# Patient Record
Sex: FEMALE | Race: WHITE | NOT HISPANIC OR LATINO | Employment: FULL TIME | URBAN - METROPOLITAN AREA
[De-identification: names, ages, dates, MRNs, and addresses within clinical notes are randomized per-mention and may not be internally consistent; named-entity substitution may affect disease eponyms.]

---

## 2022-03-17 NOTE — PROGRESS NOTES
"Chief Complaint   Patient presents with   • Rectal Bleeding           History of Present Illness  Patient is a 36-year-old female who presents today for evaluation.     Patient presents today with concerns about rectal bleeding.  This started January 2021 and has worsened.  She reports the bleeding it comes and goes but has been present with most bowel movements.  She at times has noted mucus in her stools.  She has been experiencing some loose stools and diarrhea as well having around 3-4 bowel movements per day.    She was evaluated for the symptoms recently in the emergency department where CBC was normal.  Their exam in the ER did not identify a source of the bleeding.    She reports that she also has intermittent issues with heartburn.  Symptoms have occurred intermittently over the last several years.      Her sister was recently diagnosed with stage IV stomach cancer.  Her mother has diverticulitis and possibly ulcerative colitis.    She denies any antibiotic use or travel prior to symptom onset.  She takes NSAIDs around twice per week.     Result Review :       CBC AND DIFFERENTIAL (03/13/2022 19:55)       Vital Signs:   /78   Pulse 92   Temp 97.3 °F (36.3 °C)   Ht 165.1 cm (65\")   Wt 109 kg (240 lb)   SpO2 99%   BMI 39.94 kg/m²     Body mass index is 39.94 kg/m².     Physical Exam  Vitals reviewed.   Constitutional:       General: She is not in acute distress.     Appearance: She is well-developed.   HENT:      Head: Normocephalic and atraumatic.   Pulmonary:      Effort: Pulmonary effort is normal. No respiratory distress.   Abdominal:      General: Abdomen is flat. Bowel sounds are normal. There is no distension.      Palpations: Abdomen is soft.      Tenderness: There is no abdominal tenderness.   Skin:     General: Skin is dry.      Coloration: Skin is not pale.   Neurological:      Mental Status: She is alert and oriented to person, place, and time.   Psychiatric:         Thought Content: " Thought content normal.           Assessment and Plan    Diagnoses and all orders for this visit:    1. Rectal bleeding (Primary)    2. Gastroesophageal reflux disease, unspecified whether esophagitis present    3. Diarrhea, unspecified type    4. Family history of gastric cancer         Discussion  Patient presents today for evaluation of rectal bleeding bleeding.  This is a new undiagnosed problem.  Also with diarrhea and heartburn.  We will schedule EGD to assess for any evidence of esophagitis, peptic ulcer disease, or celiac disease as well as rule out any evidence of GI malignancy due to her family history.  We will schedule colonoscopy for further evaluation of rectal bleeding, concern for possible colitis or proctitis based on symptoms.          Follow Up   Return for Follow up to review results after testing complete.    Patient Instructions   Schedule EGD and colonoscopy for further evaluation.

## 2022-03-18 ENCOUNTER — OFFICE VISIT (OUTPATIENT)
Dept: GASTROENTEROLOGY | Facility: CLINIC | Age: 37
End: 2022-03-18

## 2022-03-18 ENCOUNTER — PATIENT ROUNDING (BHMG ONLY) (OUTPATIENT)
Dept: GASTROENTEROLOGY | Facility: CLINIC | Age: 37
End: 2022-03-18

## 2022-03-18 ENCOUNTER — PREP FOR SURGERY (OUTPATIENT)
Dept: SURGERY | Facility: SURGERY CENTER | Age: 37
End: 2022-03-18

## 2022-03-18 VITALS
HEART RATE: 92 BPM | WEIGHT: 240 LBS | BODY MASS INDEX: 39.99 KG/M2 | DIASTOLIC BLOOD PRESSURE: 78 MMHG | TEMPERATURE: 97.3 F | SYSTOLIC BLOOD PRESSURE: 140 MMHG | HEIGHT: 65 IN | OXYGEN SATURATION: 99 %

## 2022-03-18 DIAGNOSIS — Z80.0 FAMILY HISTORY OF GASTRIC CANCER: ICD-10-CM

## 2022-03-18 DIAGNOSIS — K62.5 RECTAL BLEEDING: Primary | ICD-10-CM

## 2022-03-18 DIAGNOSIS — K21.9 GASTROESOPHAGEAL REFLUX DISEASE, UNSPECIFIED WHETHER ESOPHAGITIS PRESENT: ICD-10-CM

## 2022-03-18 DIAGNOSIS — R19.7 DIARRHEA, UNSPECIFIED TYPE: ICD-10-CM

## 2022-03-18 PROCEDURE — 99214 OFFICE O/P EST MOD 30 MIN: CPT | Performed by: NURSE PRACTITIONER

## 2022-03-18 RX ORDER — DIPHENOXYLATE HYDROCHLORIDE AND ATROPINE SULFATE 2.5; .025 MG/1; MG/1
TABLET ORAL
COMMUNITY

## 2022-03-18 RX ORDER — SODIUM CHLORIDE 0.9 % (FLUSH) 0.9 %
10 SYRINGE (ML) INJECTION AS NEEDED
Status: CANCELLED | OUTPATIENT
Start: 2022-03-18

## 2022-03-18 RX ORDER — SODIUM CHLORIDE, SODIUM LACTATE, POTASSIUM CHLORIDE, CALCIUM CHLORIDE 600; 310; 30; 20 MG/100ML; MG/100ML; MG/100ML; MG/100ML
30 INJECTION, SOLUTION INTRAVENOUS CONTINUOUS PRN
Status: CANCELLED | OUTPATIENT
Start: 2022-03-18

## 2022-03-18 RX ORDER — TRIAMTERENE AND HYDROCHLOROTHIAZIDE 37.5; 25 MG/1; MG/1
1 TABLET ORAL DAILY
COMMUNITY
Start: 2022-03-15

## 2022-03-18 RX ORDER — SODIUM CHLORIDE 0.9 % (FLUSH) 0.9 %
3 SYRINGE (ML) INJECTION EVERY 12 HOURS SCHEDULED
Status: CANCELLED | OUTPATIENT
Start: 2022-03-18

## 2022-03-18 NOTE — PROGRESS NOTES
March 18, 2022    Hello, may I speak with Eduarda Meza?    My name is Bisi     I am  with [facility]  [address] [address] [phone].    Before we get started may I verify your date of birth? 1985 - verified    I am calling to officially welcome you to our practice and ask about your recent visit. Is this a good time to talk? Yes    Tell me about your visit with us. What things went well?  Patient states everything was great.        We're always looking for ways to make our patients' experiences even better. Do you have recommendations on ways we may improve?  No    Overall were you satisfied with your first visit to our practice? Yes       I appreciate you taking the time to speak with me today. Is there anything else I can do for you? No      Thank you, and have a great day.

## 2022-03-23 ENCOUNTER — TELEPHONE (OUTPATIENT)
Dept: GASTROENTEROLOGY | Facility: CLINIC | Age: 37
End: 2022-03-23

## 2022-03-23 NOTE — TELEPHONE ENCOUNTER
----- Message from NICOLASA Bermudez sent at 3/23/2022  7:40 AM EDT -----  Regarding: FW: Bleeding seems to be getting worse  Can you move up her scopes?  ----- Message -----  From: Faustina Jimenez APRN  Sent: 3/23/2022   7:09 AM EDT  To: NICOLASA Bermudez  Subject: FW: Bleeding seems to be getting worse             ----- Message -----  From: Eduarda Meza  Sent: 3/22/2022  10:11 PM EDT  To: [facility]  Subject: Bleeding seems to be getting worse               Hi - wanted to let you know today I have passed blood and clot like blood 3 times about an hour apart after dinner with very little actual stool. This seems to be worsening. I’m not sure if I can be seen sooner for the scopes but wanted to know if this is something that would warrant moving my appointment up.

## 2022-03-25 ENCOUNTER — OUTSIDE FACILITY SERVICE (OUTPATIENT)
Dept: GASTROENTEROLOGY | Facility: CLINIC | Age: 37
End: 2022-03-25

## 2022-03-25 PROCEDURE — 45380 COLONOSCOPY AND BIOPSY: CPT | Performed by: INTERNAL MEDICINE

## 2022-03-25 PROCEDURE — 45385 COLONOSCOPY W/LESION REMOVAL: CPT | Performed by: INTERNAL MEDICINE

## 2022-03-25 PROCEDURE — 43239 EGD BIOPSY SINGLE/MULTIPLE: CPT | Performed by: INTERNAL MEDICINE

## 2022-03-25 RX ORDER — MESALAMINE 1000 MG/1
1000 SUPPOSITORY RECTAL NIGHTLY
Qty: 30 SUPPOSITORY | Refills: 2 | Status: SHIPPED | OUTPATIENT
Start: 2022-03-25

## 2022-03-28 ENCOUNTER — TELEPHONE (OUTPATIENT)
Dept: GASTROENTEROLOGY | Facility: CLINIC | Age: 37
End: 2022-03-28

## 2022-03-28 RX ORDER — HYDROCORTISONE ACETATE 25 MG/1
25 SUPPOSITORY RECTAL DAILY
Qty: 30 SUPPOSITORY | Refills: 1 | Status: SHIPPED | OUTPATIENT
Start: 2022-03-28

## 2022-04-07 ENCOUNTER — TELEPHONE (OUTPATIENT)
Dept: GASTROENTEROLOGY | Facility: CLINIC | Age: 37
End: 2022-04-07

## 2022-04-07 NOTE — TELEPHONE ENCOUNTER
Called patient to discuss path results/recommendations from recent EGD and CLS. She would like to know if she can stop using the mesalamine suppositories every night since she is now symptom free; denies rectal bleeding or pain. She states she became symptom free about a week after using the mesalamine suppositories. Please advise.

## 2022-04-07 NOTE — TELEPHONE ENCOUNTER
Left detailed message on patient's voicemail regarding note from cc. Lmtrc with any additional questions/concerns.

## 2022-04-14 NOTE — PROGRESS NOTES
"Chief Complaint   Patient presents with   • Follow-up         History of Present Illness  Patient is a 36-year-old female who presents today for follow-up.  She was seen in the office March 2022 for rectal bleeding, GERD, diarrhea.  She has a family history of gastric cancer.  EGD and colonoscopy were performed March 2022.  EGD with mild gastritis.  Colonoscopy with evidence of proctitis with inflammation from 0 to 14 cm as well as 1 adenomatous polyp.  Biopsies were consistent with severely active chronic proctitis.  She was given a prescription for Canasa suppositories.    Patient presents today for follow-up.  She reports 3 to 4 days of starting mesalamine suppositories.  Symptoms have not resolved.  She is no longer experiencing rectal bleeding.  A stool has been formed.  She is generally having 1 bowel movement per day.     Result Review :       SCANNED PATHOLOGY (03/25/2022)   SCANNED - COLONOSCOPY (03/25/2022)   Office Visit with Kenzie Salinas APRN (03/18/2022)   CBC AND DIFFERENTIAL (03/13/2022 19:55)       Vital Signs:   /76   Pulse 83   Temp 98.2 °F (36.8 °C)   Ht 165.1 cm (65\")   Wt 111 kg (244 lb 3.2 oz)   SpO2 100%   BMI 40.64 kg/m²     Body mass index is 40.64 kg/m².     Physical Exam  Vitals reviewed.   Constitutional:       General: She is not in acute distress.     Appearance: She is well-developed.   HENT:      Head: Normocephalic and atraumatic.   Pulmonary:      Effort: Pulmonary effort is normal. No respiratory distress.   Skin:     General: Skin is dry.      Coloration: Skin is not pale.   Neurological:      Mental Status: She is alert and oriented to person, place, and time.   Psychiatric:         Thought Content: Thought content normal.           Assessment and Plan    Diagnoses and all orders for this visit:    1. Ulcerative proctitis without complication (HCC) (Primary)    2. History of colon polyps         Discussion  Patient presents today for follow-up after colonoscopy.  " Endoscopic appearance and biopsies are consistent with ulcerative proctitis.  Discussed this new diagnosis and chronicity of this condition.  We will continue mesalamine suppositories which patient has responded well to.  If symptoms worsen in the future, could consider biologic therapy or may need to consider updated colonoscopy to reassess extent and severity of disease, otherwise patient will be due for her next colonoscopy at 5-year interval due to polyp present on this exam.    Patient was experiencing difficulty with insurance coverage of mesalamine suppositories.  If needed while we are attempting to obtain insurance coverage, will utilize hydrocortisone suppositories short-term.          Follow Up   Return in about 3 months (around 7/18/2022).    Patient Instructions   For ulcerative proctitis, continue mesalamine suppositories nightly for 1 additional week.  You may then decrease to every other night for 3 to 4 weeks and if symptoms remain controlled, decrease to every third night for 3 to 4 weeks and if symptoms are controlled, decrease to 1 suppository once per week.  If bleeding recurs, continue suppositories at the frequency that is controlling symptoms.    For surveillance of colon polyps, colonoscopy recommended at 5-year interval, due March 2027.

## 2022-04-18 ENCOUNTER — OFFICE VISIT (OUTPATIENT)
Dept: GASTROENTEROLOGY | Facility: CLINIC | Age: 37
End: 2022-04-18

## 2022-04-18 VITALS
OXYGEN SATURATION: 100 % | HEART RATE: 83 BPM | WEIGHT: 244.2 LBS | HEIGHT: 65 IN | SYSTOLIC BLOOD PRESSURE: 140 MMHG | DIASTOLIC BLOOD PRESSURE: 76 MMHG | TEMPERATURE: 98.2 F | BODY MASS INDEX: 40.69 KG/M2

## 2022-04-18 DIAGNOSIS — Z86.010 HISTORY OF COLON POLYPS: ICD-10-CM

## 2022-04-18 DIAGNOSIS — K51.20 ULCERATIVE PROCTITIS WITHOUT COMPLICATION: Primary | ICD-10-CM

## 2022-04-18 PROCEDURE — 99214 OFFICE O/P EST MOD 30 MIN: CPT | Performed by: NURSE PRACTITIONER

## 2022-04-18 NOTE — PATIENT INSTRUCTIONS
For ulcerative proctitis, continue mesalamine suppositories nightly for 1 additional week.  You may then decrease to every other night for 3 to 4 weeks and if symptoms remain controlled, decrease to every third night for 3 to 4 weeks and if symptoms are controlled, decrease to 1 suppository once per week.  If bleeding recurs, continue suppositories at the frequency that is controlling symptoms.    For surveillance of colon polyps, colonoscopy recommended at 5-year interval, due March 2027.

## 2022-08-26 ENCOUNTER — OFFICE VISIT (OUTPATIENT)
Dept: GASTROENTEROLOGY | Facility: CLINIC | Age: 37
End: 2022-08-26

## 2022-08-26 VITALS
WEIGHT: 245.1 LBS | SYSTOLIC BLOOD PRESSURE: 130 MMHG | OXYGEN SATURATION: 98 % | BODY MASS INDEX: 40.84 KG/M2 | HEIGHT: 65 IN | HEART RATE: 78 BPM | TEMPERATURE: 98 F | DIASTOLIC BLOOD PRESSURE: 88 MMHG

## 2022-08-26 DIAGNOSIS — K51.20 ULCERATIVE PROCTITIS WITHOUT COMPLICATION: Primary | ICD-10-CM

## 2022-08-26 PROCEDURE — 99213 OFFICE O/P EST LOW 20 MIN: CPT | Performed by: NURSE PRACTITIONER

## 2022-08-26 NOTE — PATIENT INSTRUCTIONS
For ulcerative proctitis, continue to monitor symptoms.  If you develop any increased frequency, mucus in stools, urgency, or bleeding, resume treatment with mesalamine suppositories.

## 2022-08-26 NOTE — PROGRESS NOTES
"Chief Complaint   Patient presents with   • Ulcerative Colitis         History of Present Illness  Patient is a 37-year-old female who presents today for Follow-up.  She has a history of ulcerative proctitis diagnosed in 2022, gastritis, GERD, and colon polyps.    Patient presents today for follow-up.  She tapered off of mesalamine suppositories after her last office visit and reports she has felt very well since that time.  She has had no urgency, abdominal pain, blood in the stool, mucus in stool, or increase in frequency.    She is generally having 1-3 bowel movements per day.  Overall she is feeling very well at this time and denies any GI complaints.     Result Review :       Office Visit with Kenzie Salinas APRN (04/18/2022)   SCANNED PATHOLOGY (03/25/2022)   SCANNED - COLONOSCOPY (03/25/2022)   Office Visit with Kenzie Salinas APRN (03/18/2022)   CBC AND DIFFERENTIAL (03/13/2022 19:55)       Vital Signs:   /88   Pulse 78   Temp 98 °F (36.7 °C)   Ht 165.1 cm (65\")   Wt 111 kg (245 lb 1.6 oz)   SpO2 98%   BMI 40.79 kg/m²     Body mass index is 40.79 kg/m².     Physical Exam  Vitals reviewed.   Constitutional:       General: She is not in acute distress.     Appearance: She is well-developed.   HENT:      Head: Normocephalic and atraumatic.   Pulmonary:      Effort: Pulmonary effort is normal. No respiratory distress.   Skin:     General: Skin is dry.      Coloration: Skin is not pale.   Neurological:      Mental Status: She is alert and oriented to person, place, and time.   Psychiatric:         Thought Content: Thought content normal.           Assessment and Plan    Diagnoses and all orders for this visit:    1. Ulcerative proctitis without complication (HCC) (Primary)         Discussion  Patient presents today for follow-up of ulcerative proctitis.  She had complete resolution of symptoms with treatment with mesalamine suppositories.  As symptoms have resolved, we will continue to monitor " at this time.  Discussed with patient if symptoms recur, recommend resuming treatment with mesalamine suppositories.  We will plan on follow-up in 1 year for reassessment and patient instructed to notify the office for any new or worsening symptoms.  Also recommended avoiding NSAID medications as these can sometimes precipitate a flare of ulcerative proctitis.          Follow Up   Return in about 1 year (around 8/26/2023), or if symptoms worsen or fail to improve.    Patient Instructions   For ulcerative proctitis, continue to monitor symptoms.  If you develop any increased frequency, mucus in stools, urgency, or bleeding, resume treatment with mesalamine suppositories.

## 2023-08-25 ENCOUNTER — OFFICE VISIT (OUTPATIENT)
Dept: GASTROENTEROLOGY | Facility: CLINIC | Age: 38
End: 2023-08-25
Payer: COMMERCIAL

## 2023-08-25 VITALS
TEMPERATURE: 98.7 F | OXYGEN SATURATION: 98 % | SYSTOLIC BLOOD PRESSURE: 120 MMHG | DIASTOLIC BLOOD PRESSURE: 82 MMHG | HEIGHT: 65 IN | WEIGHT: 237.1 LBS | BODY MASS INDEX: 39.5 KG/M2 | HEART RATE: 79 BPM

## 2023-08-25 DIAGNOSIS — K51.20 ULCERATIVE PROCTITIS WITHOUT COMPLICATION: Primary | ICD-10-CM

## 2023-08-25 DIAGNOSIS — Z86.010 HISTORY OF COLON POLYPS: ICD-10-CM

## 2023-08-25 PROCEDURE — 99213 OFFICE O/P EST LOW 20 MIN: CPT | Performed by: NURSE PRACTITIONER

## 2023-08-25 RX ORDER — CALCIPOTRIENE 50 UG/G
CREAM TOPICAL
COMMUNITY
Start: 2023-04-20

## 2023-08-25 RX ORDER — NALTREXONE HYDROCHLORIDE 50 MG/1
50 TABLET, FILM COATED ORAL DAILY
COMMUNITY

## 2023-08-25 NOTE — PATIENT INSTRUCTIONS
For ulcerative proctitis, currently asymptomatic, continue to monitor.  Please notify the office if you develop any blood in the stool, diarrhea, or urgency.    For surveillance of colon polyps, colonoscopy recommended at 5-year interval, due in 2027.

## 2023-08-25 NOTE — PROGRESS NOTES
"Chief Complaint   Patient presents with    Ulcerative Colitis         History of Present Illness  Patient is a 38-year-old female who presents today for Follow-up. She has a history of ulcerative proctitis diagnosed in 2022, gastritis, GERD, and colon polyps.     Patient presents today for follow-up.  She has had no issues with ulcerative proctitis since last office visit.  Has not required any medications.  Denies any blood or mucus in the stools.  Generally has 1 bowel movement per day.  Denies any abdominal pain or any significant heartburn or reflux.    She is currently taking naltrexone for weight loss, has found that this has been somewhat constipating but as long as she drinks plenty of water bowels are moving regularly.  She had a cervical polyp removed since last office visit, this was benign.     Result Review :       Office Visit with Kenzie Salinas APRN (08/26/2022)    SCANNED PATHOLOGY (03/25/2022)     SCANNED - COLONOSCOPY (03/25/2022)     Vital Signs:   /82   Pulse 79   Temp 98.7 øF (37.1 øC)   Ht 165.1 cm (65\")   Wt 108 kg (237 lb 1.6 oz)   SpO2 98%   BMI 39.46 kg/mý     Body mass index is 39.46 kg/mý.     Physical Exam  Vitals reviewed.   Constitutional:       General: She is not in acute distress.     Appearance: She is well-developed.   HENT:      Head: Normocephalic and atraumatic.   Pulmonary:      Effort: Pulmonary effort is normal. No respiratory distress.   Abdominal:      General: Abdomen is flat. Bowel sounds are normal. There is no distension.      Palpations: Abdomen is soft.      Tenderness: There is no abdominal tenderness.   Skin:     General: Skin is dry.      Coloration: Skin is not pale.   Neurological:      Mental Status: She is alert and oriented to person, place, and time.   Psychiatric:         Thought Content: Thought content normal.         Assessment and Plan    Diagnoses and all orders for this visit:    1. Ulcerative proctitis without complication " (Primary)    2. History of colon polyps         Discussion  Patient presents today for follow-up of ulcerative proctitis.  She has remained asymptomatic over the last year with no medications.  Patient instructed to continue to monitor for the development of any new or worsening symptoms.  No additional treatment needed at this time.  She may follow-up with our office on an as-needed basis.          Follow Up   Return if symptoms worsen or fail to improve.    Patient Instructions   For ulcerative proctitis, currently asymptomatic, continue to monitor.  Please notify the office if you develop any blood in the stool, diarrhea, or urgency.    For surveillance of colon polyps, colonoscopy recommended at 5-year interval, due in 2027.

## 2024-03-25 RX ORDER — MESALAMINE 1000 MG/1
1000 SUPPOSITORY RECTAL NIGHTLY
Qty: 30 SUPPOSITORY | Refills: 2 | Status: SHIPPED | OUTPATIENT
Start: 2024-03-25

## 2024-03-25 RX ORDER — HYDROCORTISONE ACETATE 25 MG/1
25 SUPPOSITORY RECTAL DAILY
Qty: 30 SUPPOSITORY | Refills: 1 | Status: SHIPPED | OUTPATIENT
Start: 2024-03-25

## 2024-03-28 ENCOUNTER — TELEPHONE (OUTPATIENT)
Dept: GASTROENTEROLOGY | Facility: CLINIC | Age: 39
End: 2024-03-28
Payer: COMMERCIAL

## 2024-03-28 NOTE — TELEPHONE ENCOUNTER
Please let patient know.  If she is having ongoing symptoms, please schedule follow-up appointment.

## 2024-03-28 NOTE — TELEPHONE ENCOUNTER
PA for Anucort-Hc Sup 25mg was DENIED  REASON:  Here is a breakdown of why this request was denied. It includes the criteria used to make the decision.  The denial was based on our criteria for Anucort-Hc Sup 25mg. The requested drug is not covered by the plan.

## 2024-09-05 ENCOUNTER — PREP FOR SURGERY (OUTPATIENT)
Dept: SURGERY | Facility: SURGERY CENTER | Age: 39
End: 2024-09-05
Payer: COMMERCIAL

## 2024-09-05 ENCOUNTER — OFFICE VISIT (OUTPATIENT)
Dept: GASTROENTEROLOGY | Facility: CLINIC | Age: 39
End: 2024-09-05
Payer: COMMERCIAL

## 2024-09-05 VITALS
DIASTOLIC BLOOD PRESSURE: 80 MMHG | TEMPERATURE: 96.8 F | WEIGHT: 242 LBS | HEIGHT: 65 IN | SYSTOLIC BLOOD PRESSURE: 120 MMHG | HEART RATE: 84 BPM | OXYGEN SATURATION: 98 % | BODY MASS INDEX: 40.32 KG/M2

## 2024-09-05 DIAGNOSIS — Z80.0 FAMILY HISTORY OF GASTRIC CANCER: ICD-10-CM

## 2024-09-05 DIAGNOSIS — K51.20 ULCERATIVE PROCTITIS WITHOUT COMPLICATION: Primary | ICD-10-CM

## 2024-09-05 DIAGNOSIS — Z86.010 HISTORY OF COLON POLYPS: ICD-10-CM

## 2024-09-05 DIAGNOSIS — K29.70 GASTRITIS WITHOUT BLEEDING, UNSPECIFIED CHRONICITY, UNSPECIFIED GASTRITIS TYPE: Primary | ICD-10-CM

## 2024-09-05 DIAGNOSIS — Z12.11 ENCOUNTER FOR SCREENING FOR MALIGNANT NEOPLASM OF COLON: ICD-10-CM

## 2024-09-05 DIAGNOSIS — K51.20 ULCERATIVE PROCTITIS WITHOUT COMPLICATION: ICD-10-CM

## 2024-09-05 DIAGNOSIS — K29.70 GASTRITIS WITHOUT BLEEDING, UNSPECIFIED CHRONICITY, UNSPECIFIED GASTRITIS TYPE: ICD-10-CM

## 2024-09-05 RX ORDER — SODIUM CHLORIDE 0.9 % (FLUSH) 0.9 %
3 SYRINGE (ML) INJECTION EVERY 12 HOURS SCHEDULED
OUTPATIENT
Start: 2024-09-05

## 2024-09-05 RX ORDER — SODIUM CHLORIDE 0.9 % (FLUSH) 0.9 %
10 SYRINGE (ML) INJECTION AS NEEDED
OUTPATIENT
Start: 2024-09-05

## 2024-09-05 RX ORDER — SODIUM CHLORIDE, SODIUM LACTATE, POTASSIUM CHLORIDE, CALCIUM CHLORIDE 600; 310; 30; 20 MG/100ML; MG/100ML; MG/100ML; MG/100ML
30 INJECTION, SOLUTION INTRAVENOUS CONTINUOUS PRN
OUTPATIENT
Start: 2024-09-05

## 2024-09-05 NOTE — PROGRESS NOTES
"Chief Complaint   Patient presents with    GI Problem         History of Present Illness  Patient is a 39-year-old female who presents today for follow-up.  She has a history of ulcerative proctitis, gastritis, GERD, and colon polyps.    Patient presents today for follow-up.  Reports overall she has done well since last office visit.  She did have a flare earlier in the year of proctitis.  She use mesalamine suppositories for 2 to 3 weeks and symptoms resolved.  At the time she was having increased stool frequency, nocturnal stools, and mucus in her stools.    Today in the office she is feeling well.  Bowels are moving regularly with no mucus or urgency.  Denies any abdominal pain.    Is not currently experiencing any issues with reflux.    Her sister passed away earlier this year from gastric cancer.     Result Review :       Office Visit with Kenzie Salinas APRN (08/26/2022)    SCANNED PATHOLOGY (03/25/2022)     SCANNED - COLONOSCOPY (03/25/2022)     Vital Signs:   /80   Pulse 84   Temp 96.8 °F (36 °C)   Ht 165.1 cm (65\")   Wt 110 kg (242 lb)   SpO2 98%   BMI 40.27 kg/m²     Body mass index is 40.27 kg/m².     Physical Exam  Vitals reviewed.   Constitutional:       General: She is not in acute distress.     Appearance: She is well-developed.   HENT:      Head: Normocephalic and atraumatic.   Pulmonary:      Effort: Pulmonary effort is normal. No respiratory distress.   Abdominal:      General: Abdomen is flat. Bowel sounds are normal. There is no distension.      Palpations: Abdomen is soft.      Tenderness: There is no abdominal tenderness.   Skin:     General: Skin is dry.      Coloration: Skin is not pale.   Neurological:      Mental Status: She is alert and oriented to person, place, and time.   Psychiatric:         Thought Content: Thought content normal.           Assessment and Plan    Diagnoses and all orders for this visit:    1. Ulcerative proctitis without complication (Primary)    2. " Gastritis without bleeding, unspecified chronicity, unspecified gastritis type    3. Family history of gastric cancer    4. History of colon polyps    5. Encounter for screening for malignant neoplasm of colon         Discussion  Patient presents today for follow-up.  Overall she is doing well at this time.  She had a flare of ulcerative proctitis earlier in the year that responded well to mesalamine suppositories.  Discussed recommendation to continue current treatment with mesalamine suppositories as needed.    She has a history of colon polyps, we will plan on next colonoscopy March 2025 for surveillance of polyps and as well as to reassess extent and severity of ulcerative proctitis.  We will plan on EGD at time of colonoscopy to follow-up on her gastritis due to her family history of gastric cancer.          Follow Up   Return for Follow up to review results after testing complete.    Patient Instructions   Schedule EGD and colonoscopy for surveillance to be done March 2025.    For proctitis, may continue using mesalamine suppositories as needed.  If symptoms flare up, recommend restarting suppositories nightly for 2 to 3 weeks or until symptoms resolve.

## 2024-09-05 NOTE — PATIENT INSTRUCTIONS
Schedule EGD and colonoscopy for surveillance to be done March 2025.    For proctitis, may continue using mesalamine suppositories as needed.  If symptoms flare up, recommend restarting suppositories nightly for 2 to 3 weeks or until symptoms resolve.

## 2025-01-20 ENCOUNTER — TELEPHONE (OUTPATIENT)
Dept: GASTROENTEROLOGY | Facility: CLINIC | Age: 40
End: 2025-01-20
Payer: COMMERCIAL

## 2025-03-21 RX ORDER — MESALAMINE 1000 MG/1
1000 SUPPOSITORY RECTAL NIGHTLY
Qty: 30 SUPPOSITORY | Refills: 2 | Status: SHIPPED | OUTPATIENT
Start: 2025-03-21

## 2025-04-18 ENCOUNTER — LAB REQUISITION (OUTPATIENT)
Dept: LAB | Facility: HOSPITAL | Age: 40
End: 2025-04-18
Payer: COMMERCIAL

## 2025-04-18 ENCOUNTER — OUTSIDE FACILITY SERVICE (OUTPATIENT)
Dept: GASTROENTEROLOGY | Facility: CLINIC | Age: 40
End: 2025-04-18
Payer: COMMERCIAL

## 2025-04-18 DIAGNOSIS — R12 HEARTBURN: ICD-10-CM

## 2025-04-18 DIAGNOSIS — K62.89 OTHER SPECIFIED DISEASES OF ANUS AND RECTUM: ICD-10-CM

## 2025-04-18 DIAGNOSIS — Z12.11 ENCOUNTER FOR SCREENING FOR MALIGNANT NEOPLASM OF COLON: ICD-10-CM

## 2025-04-18 DIAGNOSIS — D12.5 BENIGN NEOPLASM OF SIGMOID COLON: ICD-10-CM

## 2025-04-18 DIAGNOSIS — K21.00 GASTRO-ESOPHAGEAL REFLUX DISEASE WITH ESOPHAGITIS, WITHOUT BLEEDING: ICD-10-CM

## 2025-04-18 PROCEDURE — 88313 SPECIAL STAINS GROUP 2: CPT | Performed by: INTERNAL MEDICINE

## 2025-04-18 PROCEDURE — 43239 EGD BIOPSY SINGLE/MULTIPLE: CPT | Performed by: INTERNAL MEDICINE

## 2025-04-18 PROCEDURE — 88305 TISSUE EXAM BY PATHOLOGIST: CPT | Performed by: INTERNAL MEDICINE

## 2025-04-18 PROCEDURE — 45380 COLONOSCOPY AND BIOPSY: CPT | Performed by: INTERNAL MEDICINE

## 2025-04-18 PROCEDURE — 45385 COLONOSCOPY W/LESION REMOVAL: CPT | Performed by: INTERNAL MEDICINE

## 2025-04-22 DIAGNOSIS — K21.00 GASTROESOPHAGEAL REFLUX DISEASE WITH ESOPHAGITIS WITHOUT HEMORRHAGE: Primary | ICD-10-CM

## 2025-04-22 DIAGNOSIS — K62.89 PROCTITIS: ICD-10-CM

## 2025-04-22 LAB
CYTO UR: NORMAL
LAB AP CASE REPORT: NORMAL
PATH REPORT.FINAL DX SPEC: NORMAL
PATH REPORT.GROSS SPEC: NORMAL

## 2025-04-23 RX ORDER — PANTOPRAZOLE SODIUM 40 MG/1
40 TABLET, DELAYED RELEASE ORAL DAILY
Qty: 30 TABLET | Refills: 5 | Status: SHIPPED | OUTPATIENT
Start: 2025-04-23

## 2025-04-23 RX ORDER — MESALAMINE 1000 MG/1
1000 SUPPOSITORY RECTAL NIGHTLY
Qty: 30 SUPPOSITORY | Refills: 2 | Status: SHIPPED | OUTPATIENT
Start: 2025-04-23

## 2025-07-24 ENCOUNTER — TELEPHONE (OUTPATIENT)
Dept: GASTROENTEROLOGY | Facility: CLINIC | Age: 40
End: 2025-07-24
Payer: COMMERCIAL

## 2025-07-24 NOTE — TELEPHONE ENCOUNTER
7/24/25 1:06 pm LMTRC, PT HAD APPT WITH [facility] 8/6/25 AND WANTS TO RESCHEDULE TO NEXT AVAILABLE APPT. MT